# Patient Record
Sex: FEMALE | Race: ASIAN | NOT HISPANIC OR LATINO | URBAN - METROPOLITAN AREA
[De-identification: names, ages, dates, MRNs, and addresses within clinical notes are randomized per-mention and may not be internally consistent; named-entity substitution may affect disease eponyms.]

---

## 2020-11-19 PROBLEM — Z00.00 ENCOUNTER FOR PREVENTIVE HEALTH EXAMINATION: Status: ACTIVE | Noted: 2020-11-19

## 2020-12-08 ENCOUNTER — OUTPATIENT (OUTPATIENT)
Dept: OUTPATIENT SERVICES | Facility: HOSPITAL | Age: 85
LOS: 1 days | End: 2020-12-08
Payer: MEDICARE

## 2020-12-08 ENCOUNTER — APPOINTMENT (OUTPATIENT)
Dept: ORTHOPEDIC SURGERY | Facility: CLINIC | Age: 85
End: 2020-12-08
Payer: MEDICARE

## 2020-12-08 ENCOUNTER — RESULT REVIEW (OUTPATIENT)
Age: 85
End: 2020-12-08

## 2020-12-08 VITALS — TEMPERATURE: 97.3 F | BODY MASS INDEX: 22.68 KG/M2 | HEIGHT: 55 IN | WEIGHT: 98 LBS

## 2020-12-08 DIAGNOSIS — Z86.79 PERSONAL HISTORY OF OTHER DISEASES OF THE CIRCULATORY SYSTEM: ICD-10-CM

## 2020-12-08 DIAGNOSIS — Z72.3 LACK OF PHYSICAL EXERCISE: ICD-10-CM

## 2020-12-08 PROCEDURE — 72100 X-RAY EXAM L-S SPINE 2/3 VWS: CPT

## 2020-12-08 PROCEDURE — 72100 X-RAY EXAM L-S SPINE 2/3 VWS: CPT | Mod: 26

## 2020-12-08 PROCEDURE — 99072 ADDL SUPL MATRL&STAF TM PHE: CPT

## 2020-12-08 PROCEDURE — 99204 OFFICE O/P NEW MOD 45 MIN: CPT

## 2020-12-08 RX ORDER — GLUCOSAMINE HCL/CHONDROITIN SU 500-400 MG
3 CAPSULE ORAL
Refills: 0 | Status: ACTIVE | COMMUNITY

## 2020-12-08 RX ORDER — AMLODIPINE BESYLATE 10 MG/1
10 TABLET ORAL
Refills: 0 | Status: ACTIVE | COMMUNITY

## 2020-12-08 RX ORDER — LOSARTAN POTASSIUM 50 MG/1
50 TABLET, FILM COATED ORAL
Refills: 0 | Status: ACTIVE | COMMUNITY

## 2020-12-08 RX ORDER — ATORVASTATIN CALCIUM 10 MG/1
10 TABLET, FILM COATED ORAL
Refills: 0 | Status: ACTIVE | COMMUNITY

## 2020-12-08 RX ORDER — DONEPEZIL HYDROCHLORIDE 10 MG/1
10 TABLET ORAL
Refills: 0 | Status: ACTIVE | COMMUNITY

## 2020-12-08 RX ORDER — FAMOTIDINE 40 MG/1
40 TABLET, FILM COATED ORAL
Refills: 0 | Status: ACTIVE | COMMUNITY

## 2020-12-08 RX ORDER — CARVEDILOL 12.5 MG/1
12.5 TABLET, FILM COATED ORAL
Qty: 180 | Refills: 0 | Status: ACTIVE | COMMUNITY
Start: 2020-09-15

## 2020-12-08 RX ORDER — CLOPIDOGREL BISULFATE 75 MG/1
75 TABLET, FILM COATED ORAL
Refills: 0 | Status: ACTIVE | COMMUNITY

## 2020-12-08 RX ORDER — ACETAMINOPHEN 325 MG/1
325 TABLET ORAL
Refills: 0 | Status: ACTIVE | COMMUNITY

## 2020-12-08 RX ORDER — LIDOCAINE 5% 700 MG/1
PATCH TOPICAL
Refills: 0 | Status: ACTIVE | COMMUNITY

## 2020-12-08 RX ORDER — CALCIUM CARBONATE/VITAMIN D3 600MG-5MCG
TABLET ORAL
Refills: 0 | Status: ACTIVE | COMMUNITY

## 2020-12-08 RX ORDER — LORATADINE 10 MG
TABLET,DISINTEGRATING ORAL
Refills: 0 | Status: ACTIVE | COMMUNITY

## 2020-12-08 RX ORDER — ZOLPIDEM TARTRATE 10 MG/1
10 TABLET ORAL
Qty: 30 | Refills: 0 | Status: ACTIVE | COMMUNITY
Start: 2020-06-19

## 2020-12-08 RX ORDER — LINACLOTIDE 72 UG/1
72 CAPSULE, GELATIN COATED ORAL
Refills: 0 | Status: ACTIVE | COMMUNITY

## 2020-12-08 NOTE — DISCUSSION/SUMMARY
[de-identified] : Discussed the results of the patient's history, physical exam, and imaging with patient and aide (with aide translating). Ms. Back reports low back pain after a fall 2 weeks ago. Was seen at [facility], discharged to rehab. Low back pain now resolved. XR shows L1 compression fracture, unchanged from 11/2020 imaging. No indication for intervention at this time. Emphasized the importance of compliance with TLSO brace, patient to wear full time. The patient will follow up with me in approximately 6 weeks, sooner if there is an issue. XR AP/lateral lumbar at that time. All questions were answered.

## 2020-12-08 NOTE — REASON FOR VISIT
[Initial Consultation] : an initial consultation for [Other: ____] : [unfilled] [Formal Caregiver] : formal caregiver [Patient Declined  Services] : - None: Patient declined  services [FreeTextEntry3] : aide from [employer] present with patient during exam and translating

## 2020-12-08 NOTE — HISTORY OF PRESENT ILLNESS
[de-identified] : Ms. CARR is a very pleasant 89 year old female who complains of low back pain, onset after a fall at home 2 weeks ago, now resolved. There is no radiation of pain.  Patient denies extremity numbness and paresthesias. After fall, was taken to [facility], diagnosed with L1 compression fracture. Discharged to rehab. Patient states lives alone with home health aide, children visit regularly. Prior to fall, was ambulating occasionally with a walker, occasionally using wheelchair. No change in mobility after fall. Has TLSO brace, has been noncompliant per patient.\par \par The patient reports no loss of hand dexterity.\par The patient states there no loss of balance when walking.\par There no sensory loss in the arms or legs\par The patent no difficulty with urination.\par \par The patient no history of previous spine surgery.\par \par The patient has no history of unexpected weight loss, no history of active cancer, no history bladder or bowel dysfunction, no night pain, no fevers or chills.\par \par The past medical history, surgical history, family history, allergies, medications, 10+ point review of systems, family history and social history were reviewed and non contributory.\par \par

## 2020-12-08 NOTE — PHYSICAL EXAM
[de-identified] : General: patient is well developed, well nourished, in no acute \par distress, alert and oriented x 3. \par \par Mood and affect: normal\par \par Respiratory: no respiratory distress noted\par \par Skin: no scars over spine, skin intact, no erythema, increased warmth\par \par Alignment:The spine is well compensated in the coronal and sagittal plane. \par \par Gait: The patient is able to tandem gait with assistance\par \par Palpation: no tenderness to palpation spine or paraspinal region\par \par Range of motion: Lumbar spine ROM is restricted\par \par Neurologic Exam:\par Motor: Manual Muscle testing in the lower extremities is 5 out of 5 in all muscle groups. There is no evidence of muscular atrophy in the lower extremities. Sensory: Sensation to light touch is grossly intact in the lower extremities\par \par Reflexes: DTR are present and symmetric throughout\par \par Hip Exam: No pain with internal or external rotation of hips bilaterally\par \par Special tests: Straight leg raise test negative.  Cross straight leg test negative.  RADHA test negative\par \par Vascular: Examination of the peripheral vascular system demonstrates no evidence of congestion or edema. no evidence of lymphedema bilateral lower extremities\par \par  [de-identified] : XR 12/2020: L1 compression fracture with approximately 75% loss of vertebral body height, multilevel degenerative changes; unchanged from 11/2020 imaging

## 2021-01-19 ENCOUNTER — OUTPATIENT (OUTPATIENT)
Dept: OUTPATIENT SERVICES | Facility: HOSPITAL | Age: 86
LOS: 1 days | End: 2021-01-19
Payer: MEDICARE

## 2021-01-19 ENCOUNTER — APPOINTMENT (OUTPATIENT)
Dept: ORTHOPEDIC SURGERY | Facility: CLINIC | Age: 86
End: 2021-01-19
Payer: MEDICARE

## 2021-01-19 ENCOUNTER — RESULT REVIEW (OUTPATIENT)
Age: 86
End: 2021-01-19

## 2021-01-19 VITALS — TEMPERATURE: 97.5 F

## 2021-01-19 DIAGNOSIS — S32.010A WEDGE COMPRESSION FRACTURE OF FIRST LUMBAR VERTEBRA, INITIAL ENCOUNTER FOR CLOSED FRACTURE: ICD-10-CM

## 2021-01-19 PROCEDURE — 72100 X-RAY EXAM L-S SPINE 2/3 VWS: CPT | Mod: 26

## 2021-01-19 PROCEDURE — 99072 ADDL SUPL MATRL&STAF TM PHE: CPT

## 2021-01-19 PROCEDURE — 72100 X-RAY EXAM L-S SPINE 2/3 VWS: CPT

## 2021-01-19 PROCEDURE — 99214 OFFICE O/P EST MOD 30 MIN: CPT

## 2021-01-19 RX ORDER — AMLODIPINE BESYLATE 5 MG/1
5 TABLET ORAL
Qty: 25 | Refills: 0 | Status: ACTIVE | COMMUNITY
Start: 2020-12-26

## 2021-01-22 NOTE — HISTORY OF PRESENT ILLNESS
[de-identified] : Follow up 1/19/21: Patient presents for follow up. Continues to have low back pain, mildly improved from last visit. Still in rehab, ambulating with a walker (in wheelchair today). Wearing TLSO brace full time, removes for sleep. Denies new neurologic symptoms. \par \par Initial 12/8/20: Ms. CARR is a very pleasant 89 year old female who complains of low back pain, onset after a fall at home 2 weeks ago, now resolved. There is no radiation of pain.  Patient denies extremity numbness and paresthesias. After fall, was taken to [facility], diagnosed with L1 compression fracture. Discharged to rehab. Patient states lives alone with home health aide, children visit regularly. Prior to fall, was ambulating occasionally with a walker, occasionally using wheelchair. No change in mobility after fall. Has TLSO brace, has been noncompliant per patient.\par \par The patient reports no loss of hand dexterity.\par The patient states there no loss of balance when walking.\par There no sensory loss in the arms or legs\par The patent no difficulty with urination.\par \par The patient no history of previous spine surgery.\par \par The patient has no history of unexpected weight loss, no history of active cancer, no history bladder or bowel dysfunction, no night pain, no fevers or chills.\par \par The past medical history, surgical history, family history, allergies, medications, 10+ point review of systems, family history and social history were reviewed and non contributory.\par \par

## 2021-01-22 NOTE — REASON FOR VISIT
[Follow-Up Visit] : a follow-up visit for [Other: ____] : [unfilled] [Formal Caregiver] : formal caregiver [Patient Declined  Services] : - None: Patient declined  services [FreeTextEntry3] : formal caregiver present and translating for patient

## 2021-01-22 NOTE — DISCUSSION/SUMMARY
[de-identified] : Discussed the results of the patient's history, physical exam, and imaging with patient and aide (with aide translating). Ms. Back reports low back pain after a fall  2 months ago. Low back pain mildly improved from last visit. L1 compression fracture stable from last study 1 month ago. No indication for intervention at this time. Emphasized the importance of compliance with TLSO brace, patient to wear full time, can remove for sleeping/bathing. Patient should have osteoporosis work up by PCP/endocrinologist in the setting of this osteoporotic fracture, recommendation conveyed to rehab on consult note. The patient will follow up with me in approximately 6 weeks, sooner if there is an issue. XR AP/lateral lumbar at that time. All questions were answered.

## 2021-01-22 NOTE — PHYSICAL EXAM
[de-identified] : General: patient is well developed, well nourished, in no acute \par distress, alert and oriented x 3. \par \par Mood and affect: normal\par \par Respiratory: no respiratory distress noted\par \par Skin: no scars over spine, skin intact, no erythema, increased warmth\par \par Alignment:The spine is well compensated in the coronal and sagittal plane. \par \par Gait: The patient is able to tandem gait with assistance\par \par Palpation: no tenderness to palpation spine or paraspinal region\par \par Range of motion: Lumbar spine ROM is restricted\par \par Neurologic Exam:\par Motor: Manual Muscle testing in the lower extremities is 5 out of 5 in all muscle groups. There is no evidence of muscular atrophy in the lower extremities. Sensory: Sensation to light touch is grossly intact in the lower extremities\par \par Reflexes: DTR are present and symmetric throughout\par \par Hip Exam: No pain with internal or external rotation of hips bilaterally\par \par Special tests: Straight leg raise test negative.  Cross straight leg test negative.  RADHA test negative\par \par Vascular: Examination of the peripheral vascular system demonstrates no evidence of congestion or edema. no evidence of lymphedema bilateral lower extremities\par \par  [de-identified] : XR 1/2021 (my read): L1 compression fracture with approximately 75% loss of vertebral body height, multilevel degenerative changes; unchanged \par \par XR 12/2020: L1 compression fracture with approximately 75% loss of vertebral body height, multilevel degenerative changes; unchanged from 11/2020 imaging

## 2021-03-02 ENCOUNTER — APPOINTMENT (OUTPATIENT)
Dept: ORTHOPEDIC SURGERY | Facility: CLINIC | Age: 86
End: 2021-03-02